# Patient Record
Sex: FEMALE | Race: WHITE | NOT HISPANIC OR LATINO | Employment: FULL TIME | URBAN - METROPOLITAN AREA
[De-identification: names, ages, dates, MRNs, and addresses within clinical notes are randomized per-mention and may not be internally consistent; named-entity substitution may affect disease eponyms.]

---

## 2023-09-28 LAB
ALANINE AMINOTRANSFERASE (SGPT) (U/L) IN SER/PLAS: 10 U/L (ref 7–45)
ALBUMIN (G/DL) IN SER/PLAS: 4.6 G/DL (ref 3.4–5)
ALKALINE PHOSPHATASE (U/L) IN SER/PLAS: 59 U/L (ref 33–110)
ANION GAP IN SER/PLAS: 15 MMOL/L (ref 10–20)
ASPARTATE AMINOTRANSFERASE (SGOT) (U/L) IN SER/PLAS: 14 U/L (ref 9–39)
BILIRUBIN TOTAL (MG/DL) IN SER/PLAS: 0.7 MG/DL (ref 0–1.2)
CALCIUM (MG/DL) IN SER/PLAS: 10 MG/DL (ref 8.6–10.6)
CARBON DIOXIDE, TOTAL (MMOL/L) IN SER/PLAS: 27 MMOL/L (ref 21–32)
CHLORIDE (MMOL/L) IN SER/PLAS: 102 MMOL/L (ref 98–107)
CHOLESTEROL (MG/DL) IN SER/PLAS: 193 MG/DL (ref 0–199)
CHOLESTEROL IN HDL (MG/DL) IN SER/PLAS: 74.7 MG/DL
CHOLESTEROL/HDL RATIO: 2.6
CREATININE (MG/DL) IN SER/PLAS: 0.87 MG/DL (ref 0.5–1.05)
GFR FEMALE: 80 ML/MIN/1.73M2
GLUCOSE (MG/DL) IN SER/PLAS: 78 MG/DL (ref 74–99)
LDL: 97 MG/DL (ref 0–99)
POTASSIUM (MMOL/L) IN SER/PLAS: 5.1 MMOL/L (ref 3.5–5.3)
PROTEIN TOTAL: 7.2 G/DL (ref 6.4–8.2)
SODIUM (MMOL/L) IN SER/PLAS: 139 MMOL/L (ref 136–145)
TRIGLYCERIDE (MG/DL) IN SER/PLAS: 106 MG/DL (ref 0–149)
UREA NITROGEN (MG/DL) IN SER/PLAS: 17 MG/DL (ref 6–23)
VLDL: 21 MG/DL (ref 0–40)

## 2023-11-14 ENCOUNTER — APPOINTMENT (OUTPATIENT)
Dept: PRIMARY CARE | Facility: CLINIC | Age: 53
End: 2023-11-14
Payer: COMMERCIAL

## 2023-12-16 DIAGNOSIS — N95.1 MENOPAUSAL AND FEMALE CLIMACTERIC STATES: ICD-10-CM

## 2023-12-18 RX ORDER — VENLAFAXINE HYDROCHLORIDE 37.5 MG/1
CAPSULE, EXTENDED RELEASE ORAL
Qty: 60 CAPSULE | Refills: 1 | Status: SHIPPED | OUTPATIENT
Start: 2023-12-18

## 2024-02-23 DIAGNOSIS — G47.00 INSOMNIA, UNSPECIFIED: ICD-10-CM

## 2024-02-23 RX ORDER — TRAZODONE HYDROCHLORIDE 50 MG/1
TABLET ORAL
Qty: 90 TABLET | Refills: 1 | Status: SHIPPED | OUTPATIENT
Start: 2024-02-23

## 2024-06-19 ENCOUNTER — APPOINTMENT (OUTPATIENT)
Dept: PRIMARY CARE | Facility: CLINIC | Age: 54
End: 2024-06-19
Payer: COMMERCIAL

## 2024-07-22 ENCOUNTER — OFFICE VISIT (OUTPATIENT)
Dept: PRIMARY CARE | Facility: CLINIC | Age: 54
End: 2024-07-22
Payer: COMMERCIAL

## 2024-07-22 ENCOUNTER — LAB (OUTPATIENT)
Dept: LAB | Facility: LAB | Age: 54
End: 2024-07-22
Payer: COMMERCIAL

## 2024-07-22 VITALS
SYSTOLIC BLOOD PRESSURE: 140 MMHG | WEIGHT: 178 LBS | RESPIRATION RATE: 16 BRPM | DIASTOLIC BLOOD PRESSURE: 90 MMHG | BODY MASS INDEX: 28.73 KG/M2 | TEMPERATURE: 97.3 F | HEART RATE: 88 BPM

## 2024-07-22 DIAGNOSIS — E04.9 THYROID ENLARGEMENT: Primary | ICD-10-CM

## 2024-07-22 DIAGNOSIS — Z00.00 PHYSICAL EXAM: ICD-10-CM

## 2024-07-22 DIAGNOSIS — E04.9 THYROID ENLARGEMENT: ICD-10-CM

## 2024-07-22 LAB
ALBUMIN SERPL BCP-MCNC: 4.7 G/DL (ref 3.4–5)
ALP SERPL-CCNC: 74 U/L (ref 33–110)
ALT SERPL W P-5'-P-CCNC: 12 U/L (ref 7–45)
ANION GAP SERPL CALC-SCNC: 10 MMOL/L (ref 10–20)
APPEARANCE UR: CLEAR
AST SERPL W P-5'-P-CCNC: 13 U/L (ref 9–39)
BASOPHILS # BLD AUTO: 0.07 X10*3/UL (ref 0–0.1)
BASOPHILS NFR BLD AUTO: 0.9 %
BILIRUB SERPL-MCNC: 0.3 MG/DL (ref 0–1.2)
BILIRUB UR STRIP.AUTO-MCNC: NEGATIVE MG/DL
BUN SERPL-MCNC: 19 MG/DL (ref 6–23)
CALCIUM SERPL-MCNC: 9.6 MG/DL (ref 8.6–10.3)
CAOX CRY #/AREA UR COMP ASSIST: ABNORMAL /HPF
CHLORIDE SERPL-SCNC: 108 MMOL/L (ref 98–107)
CHOLEST SERPL-MCNC: 242 MG/DL (ref 0–199)
CHOLESTEROL/HDL RATIO: 4
CO2 SERPL-SCNC: 26 MMOL/L (ref 21–32)
COLOR UR: YELLOW
CREAT SERPL-MCNC: 0.93 MG/DL (ref 0.5–1.05)
EGFRCR SERPLBLD CKD-EPI 2021: 74 ML/MIN/1.73M*2
EOSINOPHIL # BLD AUTO: 0.19 X10*3/UL (ref 0–0.7)
EOSINOPHIL NFR BLD AUTO: 2.5 %
ERYTHROCYTE [DISTWIDTH] IN BLOOD BY AUTOMATED COUNT: 13.4 % (ref 11.5–14.5)
GLUCOSE SERPL-MCNC: 105 MG/DL (ref 74–99)
GLUCOSE UR STRIP.AUTO-MCNC: NORMAL MG/DL
HCT VFR BLD AUTO: 45.1 % (ref 36–46)
HDLC SERPL-MCNC: 60.9 MG/DL
HGB BLD-MCNC: 14.8 G/DL (ref 12–16)
IMM GRANULOCYTES # BLD AUTO: 0.02 X10*3/UL (ref 0–0.7)
IMM GRANULOCYTES NFR BLD AUTO: 0.3 % (ref 0–0.9)
KETONES UR STRIP.AUTO-MCNC: ABNORMAL MG/DL
LDLC SERPL CALC-MCNC: 143 MG/DL
LEUKOCYTE ESTERASE UR QL STRIP.AUTO: ABNORMAL
LYMPHOCYTES # BLD AUTO: 2.18 X10*3/UL (ref 1.2–4.8)
LYMPHOCYTES NFR BLD AUTO: 29.1 %
MCH RBC QN AUTO: 30.5 PG (ref 26–34)
MCHC RBC AUTO-ENTMCNC: 32.8 G/DL (ref 32–36)
MCV RBC AUTO: 93 FL (ref 80–100)
MONOCYTES # BLD AUTO: 0.48 X10*3/UL (ref 0.1–1)
MONOCYTES NFR BLD AUTO: 6.4 %
MUCOUS THREADS #/AREA URNS AUTO: ABNORMAL /LPF
NEUTROPHILS # BLD AUTO: 4.55 X10*3/UL (ref 1.2–7.7)
NEUTROPHILS NFR BLD AUTO: 60.8 %
NITRITE UR QL STRIP.AUTO: NEGATIVE
NON HDL CHOLESTEROL: 181 MG/DL (ref 0–149)
NRBC BLD-RTO: 0 /100 WBCS (ref 0–0)
PH UR STRIP.AUTO: 6 [PH]
PLATELET # BLD AUTO: 286 X10*3/UL (ref 150–450)
POTASSIUM SERPL-SCNC: 4.7 MMOL/L (ref 3.5–5.3)
PROT SERPL-MCNC: 6.8 G/DL (ref 6.4–8.2)
PROT UR STRIP.AUTO-MCNC: ABNORMAL MG/DL
RBC # BLD AUTO: 4.85 X10*6/UL (ref 4–5.2)
RBC # UR STRIP.AUTO: NEGATIVE /UL
RBC #/AREA URNS AUTO: ABNORMAL /HPF
SODIUM SERPL-SCNC: 139 MMOL/L (ref 136–145)
SP GR UR STRIP.AUTO: 1.03
SQUAMOUS #/AREA URNS AUTO: ABNORMAL /HPF
T4 FREE SERPL-MCNC: 0.86 NG/DL (ref 0.61–1.12)
TRIGL SERPL-MCNC: 191 MG/DL (ref 0–149)
TSH SERPL-ACNC: 1.06 MIU/L (ref 0.44–3.98)
UROBILINOGEN UR STRIP.AUTO-MCNC: NORMAL MG/DL
VLDL: 38 MG/DL (ref 0–40)
WBC # BLD AUTO: 7.5 X10*3/UL (ref 4.4–11.3)
WBC #/AREA URNS AUTO: ABNORMAL /HPF

## 2024-07-22 PROCEDURE — 83036 HEMOGLOBIN GLYCOSYLATED A1C: CPT

## 2024-07-22 PROCEDURE — 84481 FREE ASSAY (FT-3): CPT

## 2024-07-22 PROCEDURE — 99213 OFFICE O/P EST LOW 20 MIN: CPT | Performed by: FAMILY MEDICINE

## 2024-07-22 PROCEDURE — 80061 LIPID PANEL: CPT

## 2024-07-22 PROCEDURE — 87086 URINE CULTURE/COLONY COUNT: CPT

## 2024-07-22 PROCEDURE — 84439 ASSAY OF FREE THYROXINE: CPT

## 2024-07-22 PROCEDURE — 80053 COMPREHEN METABOLIC PANEL: CPT

## 2024-07-22 PROCEDURE — 36415 COLL VENOUS BLD VENIPUNCTURE: CPT

## 2024-07-22 PROCEDURE — 81001 URINALYSIS AUTO W/SCOPE: CPT

## 2024-07-22 PROCEDURE — 84443 ASSAY THYROID STIM HORMONE: CPT

## 2024-07-22 PROCEDURE — 85025 COMPLETE CBC W/AUTO DIFF WBC: CPT

## 2024-07-22 RX ORDER — OMEPRAZOLE 40 MG/1
CAPSULE, DELAYED RELEASE ORAL
COMMUNITY

## 2024-07-22 RX ORDER — ALBUTEROL SULFATE 90 UG/1
AEROSOL, METERED RESPIRATORY (INHALATION)
COMMUNITY

## 2024-07-22 RX ORDER — MONTELUKAST SODIUM 10 MG/1
TABLET ORAL
COMMUNITY
Start: 2023-11-21

## 2024-07-22 RX ORDER — ATORVASTATIN CALCIUM 10 MG/1
TABLET, FILM COATED ORAL
COMMUNITY
Start: 2023-11-21

## 2024-07-22 RX ORDER — HYDROCHLOROTHIAZIDE 12.5 MG/1
CAPSULE ORAL
COMMUNITY
Start: 2023-11-21

## 2024-07-22 NOTE — PROGRESS NOTES
Subjective   Patient ID: Katie Napier is a 53 y.o. female who presents for Neck Pain (Pain in front of neck , swelling /Hurts to turn/Dry cough ).  HPI  Patinet has chornic cough   She might have hiatal hernia   She has GERD , scheduled for endoscopy   Cough , smokes 2 cigarettes int he morning  Neck is stiff   Lately feels like heaviness in her neck   Tenderness , gaining weight.   Only she presses on it it is very tender.         Review of Systems    Past Medical History:   Diagnosis Date    Chronic insomnia     Depression     Hyperlipidemia        Past Surgical History:   Procedure Laterality Date    CHOLECYSTECTOMY  04/06/2017    Cholecystectomy    HYSTERECTOMY  04/06/2017    Hysterectomy      Social History     Socioeconomic History    Marital status: Single   Tobacco Use    Smoking status: Every Day     Types: Cigarettes      No family history on file.    MEDICATIONS AND ALLERGIES:    ALLERGIES Patient has no known allergies.    MEDICATIONS   Current Outpatient Medications on File Prior to Visit   Medication Sig Dispense Refill    albuterol 90 mcg/actuation inhaler inhale 2 puffs by mouth every 4 hours as needed for relief of wheeze or cough      atorvastatin (Lipitor) 10 mg tablet 1 by mouth every day for chol      hydroCHLOROthiazide (Microzide) 12.5 mg capsule 1 by mouth every day      montelukast (Singulair) 10 mg tablet 1 by mouth every night at bedtime for cough      omeprazole (PriLOSEC) 40 mg DR capsule 1 by mouth every day      traZODone (Desyrel) 50 mg tablet 1 by mouth every night at bedtime as needed 90 tablet 1    venlafaxine XR (Effexor-XR) 37.5 mg 24 hr capsule ONE by MOUTH every DAY TIME 14 DAYS THEN TWO BY MOUTH EVERY DAY 60 capsule 1     No current facility-administered medications on file prior to visit.              Objective   Visit Vitals  /90 (BP Location: Left arm, Patient Position: Sitting, BP Cuff Size: Large adult)   Pulse 88   Temp 36.3 °C (97.3 °F) (Temporal)   Resp 16  "  Wt 80.7 kg (178 lb)   BMI 28.73 kg/m²   Smoking Status Every Day   BSA 1.94 m²          7/20/2021     4:03 PM 7/22/2024     3:47 PM   Vitals   Systolic 116 140   Diastolic 68 90   Heart Rate 83 88   Temp 37.2 °C (98.9 °F) 36.3 °C (97.3 °F)   Resp  16   Height (in) 1.676 m (5' 6\")    Weight (lb) 162 178   BMI 26.15 kg/m2 28.73 kg/m2   BSA (m2) 1.85 m2 1.94 m2   Visit Report  Report     Physical Exam  No lumph nodes felt   Thyroid fullness notes.       Assessment & Plan  Thyroid enlargement  Will check labs below   Will order thyroid US   If workup negative and endoscopy reassuring   Recommend ENT referral and laryngoscopy   And PFT   Advised to quit smoking.   Orders:    TSH; Future    T3, free; Future    T4, free; Future    CBC and Auto Differential; Future    Urine Culture; Future    Comprehensive Metabolic Panel; Future    Hemoglobin A1C; Future    Lipid Panel; Future    Urinalysis with Reflex Microscopic; Future    US thyroid; Future    Physical exam    Orders:    TSH; Future    T3, free; Future    T4, free; Future    CBC and Auto Differential; Future    Urine Culture; Future    Comprehensive Metabolic Panel; Future    Hemoglobin A1C; Future    Lipid Panel; Future    Urinalysis with Reflex Microscopic; Future    US thyroid; Future             "

## 2024-07-23 DIAGNOSIS — E04.9 THYROID ENLARGEMENT: Primary | ICD-10-CM

## 2024-07-23 LAB
BACTERIA UR CULT: NORMAL
EST. AVERAGE GLUCOSE BLD GHB EST-MCNC: 120 MG/DL
HBA1C MFR BLD: 5.8 %
T3FREE SERPL-MCNC: 3.2 PG/ML (ref 2.3–4.2)

## 2024-07-24 DIAGNOSIS — R82.998 CALCIUM OXALATE CRYSTALS IN URINE: Primary | ICD-10-CM

## 2024-07-24 DIAGNOSIS — E04.9 THYROID ENLARGEMENT: ICD-10-CM

## 2024-07-24 DIAGNOSIS — R31.9 HEMATURIA, UNSPECIFIED TYPE: Primary | ICD-10-CM

## 2024-07-26 ENCOUNTER — HOSPITAL ENCOUNTER (OUTPATIENT)
Dept: RADIOLOGY | Facility: CLINIC | Age: 54
Discharge: HOME | End: 2024-07-26
Payer: COMMERCIAL

## 2024-07-26 DIAGNOSIS — Z00.00 PHYSICAL EXAM: ICD-10-CM

## 2024-07-26 DIAGNOSIS — E04.9 THYROID ENLARGEMENT: ICD-10-CM

## 2024-07-26 PROCEDURE — 76536 US EXAM OF HEAD AND NECK: CPT

## 2024-07-29 DIAGNOSIS — E04.1 HOT THYROID NODULE: ICD-10-CM

## 2024-07-29 DIAGNOSIS — E04.1 RIGHT THYROID NODULE: Primary | ICD-10-CM

## 2024-07-29 DIAGNOSIS — E04.1 LEFT THYROID NODULE: ICD-10-CM

## 2024-07-30 ENCOUNTER — APPOINTMENT (OUTPATIENT)
Dept: SURGERY | Facility: CLINIC | Age: 54
End: 2024-07-30
Payer: COMMERCIAL

## 2024-07-30 DIAGNOSIS — K29.60 REFLUX GASTRITIS: ICD-10-CM

## 2024-07-30 DIAGNOSIS — R13.11 ORAL PHASE DYSPHAGIA: ICD-10-CM

## 2024-07-30 DIAGNOSIS — R68.81 EARLY SATIETY: Primary | ICD-10-CM

## 2024-07-30 PROCEDURE — 99203 OFFICE O/P NEW LOW 30 MIN: CPT | Performed by: PHYSICIAN ASSISTANT

## 2024-07-30 NOTE — PROGRESS NOTES
Subjective   Patient ID: Katie Napier is a 53 y.o. female who presents for New Patient Visit (ACID REFLUX).    HPI  This is a 53-year-old female who comes in with a complaint of difficulty swallowing feels like stuff is getting stuck, early satiety feeling of fullness and burning in her chest.  She is never had an EGD.  She is taking over-the-counter Pepcid and daily omeprazole.  Since this all started she was found to have a small nodule on her thyroid.  She is seeing Dr. Manolo Corley for and she is getting a fine-needle biopsy in a couple of weeks.    Review of Systems  Review of systems is negative other than what is mentioned above        Physical Exam  Eyes: Conjunctiva non -icteric and eye lids are without obvious rash or drooping. Pupils are symmetric.   Ears, Nose, Mouth, and Throat: External ears and nose appear to be without deformity or rash. No lesions or masses noted. Hearing is grossly intact.   Neck:. No JVD noted, tracheal position is midline. No thyromegaly, no thyroid nodules  Head and Face: Examination of the head and face revealed no abnormalities.   Respiratory: No gasping or shortness of breath noted, no use of accessory muscles noted.  Lungs are clear to auscultate  Cardiovascular: Examination for edema is normal, regular rate and rhythm S1-S2  GI: Abdomen non tender to palpation, bowel sounds are present  Skin: No rashes or open lesions/ulcers identified on skin.   Musk: Digits/nails show no clubbing or cyanosis. No asymmetry or masses noted of the musculature. Examination of the muscles/joints/bones show normal range of motion. Gait is grossly normally.   Neurologic: Cranial nerves II- XII intact, motor strength 5/5 muscle strength of the lower extremities bilaterally and equal.      Objective     No diagnosis found.   There is no problem list on file for this patient.     No Known Allergies   Medication Documentation Review Audit       Reviewed by Dariana Schuler MA (Medical  Assistant) on 07/30/24 at 1009      Medication Order Taking? Sig Documenting Provider Last Dose Status   albuterol 90 mcg/actuation inhaler [id number]  inhale 2 puffs by mouth every 4 hours as needed for relief of wheeze or cough Historical Provider, MD  Active   atorvastatin (Lipitor) 10 mg tablet [id number]  1 by mouth every day for chol Historical Provider, MD  Active   hydroCHLOROthiazide (Microzide) 12.5 mg capsule [id number]  1 by mouth every day Historical Provider, MD  Active   montelukast (Singulair) 10 mg tablet [id number]  1 by mouth every night at bedtime for cough Historical Provider, MD  Active   omeprazole (PriLOSEC) 40 mg DR capsule [id number]  1 by mouth every day Historical Provider, MD  Active   traZODone (Desyrel) 50 mg tablet [id number]  1 by mouth every night at bedtime as needed Gorge Oscar MD  Active   venlafaxine XR (Effexor-XR) 37.5 mg 24 hr capsule [id number]  ONE by MOUTH every DAY TIME 14 DAYS THEN TWO BY MOUTH EVERY DAY Gorge Oscar MD  Active                    Past Medical History:   Diagnosis Date    Chronic insomnia     Depression     Hyperlipidemia      Social History     Tobacco Use   Smoking Status Every Day    Types: Cigarettes   Smokeless Tobacco Not on file     No family history on file.   Past Surgical History:   Procedure Laterality Date    CHOLECYSTECTOMY  04/06/2017    Cholecystectomy    HYSTERECTOMY  04/06/2017    Hysterectomy       Assessment/Plan   Today we had a discussion about EGD.  Patient was informed this is done as an outpatient surgery.  It takes around 30 minutes. It is done under monitored anesthesia care.  Alternatives to procedure were discussed.  All questions answered risk and benefits were discussed.  Patient had complete understanding.  Patient would like to proceed.    Encounter Diagnoses   Name Primary?    Early satiety Yes    Reflux gastritis     Oral phase dysphagia      I have reviewed all data including labs,radiologic and previous  reports.      **Portions of this medical record have been created using voice recognition software and may have minor errors which are inherent in voice recognition systems. It has not been fully edited for typographical or grammatical errors**

## 2024-08-13 ENCOUNTER — LAB (OUTPATIENT)
Dept: LAB | Facility: LAB | Age: 54
End: 2024-08-13
Payer: COMMERCIAL

## 2024-08-13 DIAGNOSIS — R31.9 HEMATURIA, UNSPECIFIED TYPE: ICD-10-CM

## 2024-08-13 DIAGNOSIS — E04.9 THYROID ENLARGEMENT: ICD-10-CM

## 2024-08-13 LAB
APPEARANCE UR: CLEAR
BILIRUB UR STRIP.AUTO-MCNC: NEGATIVE MG/DL
COLOR UR: NORMAL
GLUCOSE UR STRIP.AUTO-MCNC: NORMAL MG/DL
KETONES UR STRIP.AUTO-MCNC: NEGATIVE MG/DL
LEUKOCYTE ESTERASE UR QL STRIP.AUTO: NEGATIVE
NITRITE UR QL STRIP.AUTO: NEGATIVE
PH UR STRIP.AUTO: 6 [PH]
PROT UR STRIP.AUTO-MCNC: NEGATIVE MG/DL
RBC # UR STRIP.AUTO: NEGATIVE /UL
SP GR UR STRIP.AUTO: 1.01
UROBILINOGEN UR STRIP.AUTO-MCNC: NORMAL MG/DL

## 2024-08-13 PROCEDURE — 36415 COLL VENOUS BLD VENIPUNCTURE: CPT

## 2024-08-13 PROCEDURE — 83970 ASSAY OF PARATHORMONE: CPT

## 2024-08-13 PROCEDURE — 81003 URINALYSIS AUTO W/O SCOPE: CPT

## 2024-08-14 LAB — PTH-INTACT SERPL-MCNC: 59.5 PG/ML (ref 18.5–88)

## 2024-08-15 ENCOUNTER — PATIENT MESSAGE (OUTPATIENT)
Dept: PRIMARY CARE | Facility: CLINIC | Age: 54
End: 2024-08-15
Payer: COMMERCIAL

## 2024-08-15 DIAGNOSIS — F41.9 ANXIETY: Primary | ICD-10-CM

## 2024-08-15 RX ORDER — FLUOXETINE 10 MG/1
10 CAPSULE ORAL DAILY
Qty: 30 CAPSULE | Refills: 3 | Status: SHIPPED | OUTPATIENT
Start: 2024-08-15 | End: 2024-12-13

## 2024-08-19 ENCOUNTER — APPOINTMENT (OUTPATIENT)
Dept: SURGERY | Facility: CLINIC | Age: 54
End: 2024-08-19
Payer: COMMERCIAL

## 2024-08-19 VITALS
SYSTOLIC BLOOD PRESSURE: 151 MMHG | DIASTOLIC BLOOD PRESSURE: 96 MMHG | BODY MASS INDEX: 28.57 KG/M2 | WEIGHT: 177 LBS | HEART RATE: 60 BPM

## 2024-08-19 DIAGNOSIS — E04.1 HOT THYROID NODULE: ICD-10-CM

## 2024-08-19 DIAGNOSIS — E04.1 LEFT THYROID NODULE: ICD-10-CM

## 2024-08-19 DIAGNOSIS — E04.2 MULTINODULAR THYROID: Primary | ICD-10-CM

## 2024-08-19 DIAGNOSIS — E04.1 RIGHT THYROID NODULE: ICD-10-CM

## 2024-08-19 PROCEDURE — 99204 OFFICE O/P NEW MOD 45 MIN: CPT | Performed by: SURGERY

## 2024-08-19 ASSESSMENT — ENCOUNTER SYMPTOMS
VOICE CHANGE: 1
SHORTNESS OF BREATH: 1
ENDOCRINE NEGATIVE: 1
CARDIOVASCULAR NEGATIVE: 1
NEUROLOGICAL NEGATIVE: 1
EYES NEGATIVE: 1
CONSTITUTIONAL NEGATIVE: 1
TROUBLE SWALLOWING: 1
MUSCULOSKELETAL NEGATIVE: 1
PSYCHIATRIC NEGATIVE: 1

## 2024-08-19 NOTE — LETTER
August 19, 2024     Laura L Seaver, APRN-CNP  [address]  [facility]  [place] [address]    Patient: Katie Napier   YOB: 1970   Date of Visit: 8/19/2024       Dear Dr. Laura L Seaver, KATE-CNP:    Thank you for referring Katie Napier to me for evaluation. Below are my notes for this consultation.  If you have questions, please do not hesitate to call me. I look forward to following your patient along with you.       Sincerely,     Manolo Corley MD      CC: No Recipients  ______________________________________________________________________________________    Subjective   Patient ID: Katie Napier is a 53 y.o. female who presents for surgical consultation for multinodular thyroid gland.    HPI I saw Mrs. Napier in surgery clinic today.  She was referred by Laura Seaver for evaluation of a multinodular thyroid gland.  In July 2024 she underwent a thyroid ultrasound for an enlarged tender thyroid gland.  This demonstrated a 1.9 cm TI-RADS 4 nodule in the right thyroid lobe and a 9 mm cyst in the left lobe.  She was referred for further evaluation of the nodule in the right thyroid lobe.    I personally reviewed the ultrasound imaging and I would agree with radiology that this area in the right thyroid lobe is very indistinct.  Based on their measurements I think they may have actually over measure the actual nodule itself.  It is not a very well-organized nodule.  This is something I think we should plan a 6-month follow-up on to see if it develops and do a true nodule that would warrant formal biopsy.    I did review the actual images with her and you as you accompanied her to the office today.    She is clinically and biochemically euthyroid with a TSH of 1.06.  Free T3 and free T4 normal at 3.2 and 0.86 respectively.    She has been having a lot of symptoms of gastroesophageal reflux disease.  Mild difficulty with swallowing.  She is undergoing an EGD for this next week.   Mild respiratory symptoms when she turns her head.  No history of head neck or chest radiation exposure.    Social history she is a pharmacist at [employer].    Family history-I care for her sister who has nodular thyroid disease but does not require any thyroid surgery.    Review of Systems   Constitutional: Negative.    HENT:  Positive for trouble swallowing and voice change.    Eyes: Negative.    Respiratory:  Positive for shortness of breath.    Cardiovascular: Negative.    Endocrine: Negative.    Musculoskeletal: Negative.    Neurological: Negative.    Psychiatric/Behavioral: Negative.         Objective   Physical Exam  Vitals reviewed.   Constitutional:       Appearance: Normal appearance.   Eyes:      Comments: No proptosis   Neck:      Vascular: No carotid bruit.      Comments: Her thyroid feels normal.  No palpable nodules.  Trachea midline.  Cardiovascular:      Rate and Rhythm: Normal rate and regular rhythm.      Heart sounds: Normal heart sounds.   Pulmonary:      Effort: No respiratory distress.      Breath sounds: Normal breath sounds. No wheezing or rales.   Musculoskeletal:         General: Normal range of motion.   Lymphadenopathy:      Cervical: No cervical adenopathy.   Skin:     General: Skin is warm.   Neurological:      General: No focal deficit present.      Mental Status: She is alert and oriented to person, place, and time.   Psychiatric:         Mood and Affect: Mood normal.         Behavior: Behavior normal.         US THYROID;  7/26/2024 4:57 pm      INDICATION:  Signs/Symptoms:enlarged , tender thyroid.      COMPARISON:  None.      ACCESSION NUMBER(S):  [id number]      ORDERING CLINICIAN:  ALEIDA HERRERA      TECHNIQUE:  Multiple ultrasonographic images of the thyroid gland were obtained.      FINDINGS:          RIGHT LOBE:  The right lobe of the thyroid measures 2.1 x 1.6 x 5.5. The right  lobe of the thyroid is homogeneous in echotexture and demonstrates a  hypoechoic  ill-defined solid nodule measuring 19 x 10 x 17 mm. This  is mostly solid with a small cystic component and TR 4.      LEFT LOBE:  The left lobe of the thyroid measures 1.9 x 1.3 x 5.4. The left lobe  of the thyroid is homogeneous in echotexture and demonstrates a  spongiform nodule measuring 9 x 5 x 7 mm and TR 1.      ISTHMUS:  The isthmus measures approximately .3 and is homogeneous in  echotexture and without any identifiable nodules.      CERVICAL LYMPH NODES:  No significant adenopathy.      IMPRESSION:  Homogeneous thyroid gland with an ill-defined hypoechoic solid TR 4  right lobe nodule.    Assessment/Plan    Mrs. Napier underwent a recent thyroid ultrasound showing a very indistinct ill-defined nodule as mentioned in the radiology report and her right thyroid lobe measured at 1.9 cm in maximum dimension considered TI-RADS 4.  I personally reviewed the images and would agree it is very indistinct.  Tough to say if this is a well-defined nodule or not.  In addition, the borders of what are outlined on the ultrasound for measurements appear to extend beyond what I would sort of consider the potential nodule.  Therefore I think it is actually quite a bit smaller than this.  As the area is very indistinct, I would not recommend biopsy at this time.  However, I do think she would warrant a short-term follow-up ultrasound.  This would be to determine if the nodule develops into a bit more coalesced area that would warrant biopsy.    Plan    1.  I will order a 6-month follow-up ultrasound for her.  We can call her with results.  If the area looks like it develops into a well-organized nodule that meets criteria I can bring her back for biopsy otherwise we can continue with ultrasonographic surveillance.         Manolo Corley MD 08/19/24 9:32 AM

## 2024-08-19 NOTE — PROGRESS NOTES
Subjective   Patient ID: Katie Napier is a 53 y.o. female who presents for surgical consultation for multinodular thyroid gland.    HPI I saw Mrs. Napier in surgery clinic today.  She was referred by Laura Seaver for evaluation of a multinodular thyroid gland.  In July 2024 she underwent a thyroid ultrasound for an enlarged tender thyroid gland.  This demonstrated a 1.9 cm TI-RADS 4 nodule in the right thyroid lobe and a 9 mm cyst in the left lobe.  She was referred for further evaluation of the nodule in the right thyroid lobe.    I personally reviewed the ultrasound imaging and I would agree with radiology that this area in the right thyroid lobe is very indistinct.  Based on their measurements I think they may have actually over measure the actual nodule itself.  It is not a very well-organized nodule.  This is something I think we should plan a 6-month follow-up on to see if it develops and do a true nodule that would warrant formal biopsy.    I did review the actual images with her and you as you accompanied her to the office today.    She is clinically and biochemically euthyroid with a TSH of 1.06.  Free T3 and free T4 normal at 3.2 and 0.86 respectively.    She has been having a lot of symptoms of gastroesophageal reflux disease.  Mild difficulty with swallowing.  She is undergoing an EGD for this next week.  Mild respiratory symptoms when she turns her head.  No history of head neck or chest radiation exposure.    Social history she is a pharmacist at [employer].    Family history-I care for her sister who has nodular thyroid disease but does not require any thyroid surgery.    Review of Systems   Constitutional: Negative.    HENT:  Positive for trouble swallowing and voice change.    Eyes: Negative.    Respiratory:  Positive for shortness of breath.    Cardiovascular: Negative.    Endocrine: Negative.    Musculoskeletal: Negative.    Neurological: Negative.     Psychiatric/Behavioral: Negative.         Objective   Physical Exam  Vitals reviewed.   Constitutional:       Appearance: Normal appearance.   Eyes:      Comments: No proptosis   Neck:      Vascular: No carotid bruit.      Comments: Her thyroid feels normal.  No palpable nodules.  Trachea midline.  Cardiovascular:      Rate and Rhythm: Normal rate and regular rhythm.      Heart sounds: Normal heart sounds.   Pulmonary:      Effort: No respiratory distress.      Breath sounds: Normal breath sounds. No wheezing or rales.   Musculoskeletal:         General: Normal range of motion.   Lymphadenopathy:      Cervical: No cervical adenopathy.   Skin:     General: Skin is warm.   Neurological:      General: No focal deficit present.      Mental Status: She is alert and oriented to person, place, and time.   Psychiatric:         Mood and Affect: Mood normal.         Behavior: Behavior normal.         US THYROID;  7/26/2024 4:57 pm      INDICATION:  Signs/Symptoms:enlarged , tender thyroid.      COMPARISON:  None.      ACCESSION NUMBER(S):  [id number]      ORDERING CLINICIAN:  ALEIDA HERRERA      TECHNIQUE:  Multiple ultrasonographic images of the thyroid gland were obtained.      FINDINGS:          RIGHT LOBE:  The right lobe of the thyroid measures 2.1 x 1.6 x 5.5. The right  lobe of the thyroid is homogeneous in echotexture and demonstrates a  hypoechoic ill-defined solid nodule measuring 19 x 10 x 17 mm. This  is mostly solid with a small cystic component and TR 4.      LEFT LOBE:  The left lobe of the thyroid measures 1.9 x 1.3 x 5.4. The left lobe  of the thyroid is homogeneous in echotexture and demonstrates a  spongiform nodule measuring 9 x 5 x 7 mm and TR 1.      ISTHMUS:  The isthmus measures approximately .3 and is homogeneous in  echotexture and without any identifiable nodules.      CERVICAL LYMPH NODES:  No significant adenopathy.      IMPRESSION:  Homogeneous thyroid gland with an ill-defined hypoechoic solid  TR 4  right lobe nodule.    Assessment/Plan    Mrs. Napier underwent a recent thyroid ultrasound showing a very indistinct ill-defined nodule as mentioned in the radiology report and her right thyroid lobe measured at 1.9 cm in maximum dimension considered TI-RADS 4.  I personally reviewed the images and would agree it is very indistinct.  Tough to say if this is a well-defined nodule or not.  In addition, the borders of what are outlined on the ultrasound for measurements appear to extend beyond what I would sort of consider the potential nodule.  Therefore I think it is actually quite a bit smaller than this.  As the area is very indistinct, I would not recommend biopsy at this time.  However, I do think she would warrant a short-term follow-up ultrasound.  This would be to determine if the nodule develops into a bit more coalesced area that would warrant biopsy.    Plan    1.  I will order a 6-month follow-up ultrasound for her.  We can call her with results.  If the area looks like it develops into a well-organized nodule that meets criteria I can bring her back for biopsy otherwise we can continue with ultrasonographic surveillance.         Manolo Corley MD 08/19/24 9:32 AM

## 2024-11-01 ENCOUNTER — OFFICE VISIT (OUTPATIENT)
Dept: PRIMARY CARE | Facility: CLINIC | Age: 54
End: 2024-11-01
Payer: COMMERCIAL

## 2024-11-01 VITALS
TEMPERATURE: 97.6 F | OXYGEN SATURATION: 93 % | SYSTOLIC BLOOD PRESSURE: 134 MMHG | BODY MASS INDEX: 29.89 KG/M2 | WEIGHT: 186 LBS | HEIGHT: 66 IN | HEART RATE: 73 BPM | DIASTOLIC BLOOD PRESSURE: 91 MMHG

## 2024-11-01 DIAGNOSIS — I10 HYPERTENSION, UNSPECIFIED TYPE: Primary | ICD-10-CM

## 2024-11-01 DIAGNOSIS — G47.00 INSOMNIA, UNSPECIFIED: ICD-10-CM

## 2024-11-01 DIAGNOSIS — R73.03 PREDIABETES: ICD-10-CM

## 2024-11-01 DIAGNOSIS — E78.5 HYPERLIPIDEMIA, UNSPECIFIED HYPERLIPIDEMIA TYPE: ICD-10-CM

## 2024-11-01 DIAGNOSIS — F41.9 ANXIETY: ICD-10-CM

## 2024-11-01 DIAGNOSIS — J45.909 REACTIVE AIRWAY DISEASE WITHOUT COMPLICATION, UNSPECIFIED ASTHMA SEVERITY, UNSPECIFIED WHETHER PERSISTENT (HHS-HCC): ICD-10-CM

## 2024-11-01 DIAGNOSIS — K21.00 GASTROESOPHAGEAL REFLUX DISEASE WITH ESOPHAGITIS WITHOUT HEMORRHAGE: ICD-10-CM

## 2024-11-01 PROCEDURE — 3080F DIAST BP >= 90 MM HG: CPT | Performed by: FAMILY MEDICINE

## 2024-11-01 PROCEDURE — 3075F SYST BP GE 130 - 139MM HG: CPT | Performed by: FAMILY MEDICINE

## 2024-11-01 PROCEDURE — 99214 OFFICE O/P EST MOD 30 MIN: CPT | Performed by: FAMILY MEDICINE

## 2024-11-01 PROCEDURE — 3008F BODY MASS INDEX DOCD: CPT | Performed by: FAMILY MEDICINE

## 2024-11-01 RX ORDER — TRAZODONE HYDROCHLORIDE 50 MG/1
50 TABLET ORAL NIGHTLY
Qty: 90 TABLET | Refills: 3 | Status: SHIPPED | OUTPATIENT
Start: 2024-11-01 | End: 2025-11-01

## 2024-11-01 RX ORDER — METFORMIN HYDROCHLORIDE 500 MG/1
500 TABLET, EXTENDED RELEASE ORAL
Qty: 30 TABLET | Refills: 11 | Status: SHIPPED | OUTPATIENT
Start: 2024-11-01 | End: 2025-11-01

## 2024-11-01 RX ORDER — ATORVASTATIN CALCIUM 20 MG/1
20 TABLET, FILM COATED ORAL DAILY
Qty: 90 TABLET | Refills: 3 | Status: SHIPPED | OUTPATIENT
Start: 2024-11-01 | End: 2025-11-01

## 2024-11-01 RX ORDER — MONTELUKAST SODIUM 10 MG/1
10 TABLET ORAL NIGHTLY
Qty: 90 TABLET | Refills: 3 | Status: SHIPPED | OUTPATIENT
Start: 2024-11-01 | End: 2025-11-01

## 2024-11-01 RX ORDER — FLUOXETINE 10 MG/1
10 CAPSULE ORAL DAILY
Qty: 90 CAPSULE | Refills: 3 | Status: SHIPPED | OUTPATIENT
Start: 2024-11-01 | End: 2025-11-01

## 2024-11-01 RX ORDER — HYDROCHLOROTHIAZIDE 12.5 MG/1
12.5 CAPSULE ORAL EVERY MORNING
Qty: 90 CAPSULE | Refills: 3 | Status: SHIPPED | OUTPATIENT
Start: 2024-11-01 | End: 2025-11-01

## 2024-11-08 DIAGNOSIS — H00.012 HORDEOLUM EXTERNUM OF RIGHT LOWER EYELID: Primary | ICD-10-CM

## 2024-11-08 RX ORDER — OFLOXACIN 3 MG/ML
1 SOLUTION/ DROPS OPHTHALMIC 4 TIMES DAILY
Qty: 5 ML | Refills: 1 | Status: SHIPPED | OUTPATIENT
Start: 2024-11-08 | End: 2024-11-18

## 2024-12-02 ENCOUNTER — PATIENT MESSAGE (OUTPATIENT)
Dept: PRIMARY CARE | Facility: CLINIC | Age: 54
End: 2024-12-02
Payer: COMMERCIAL

## 2024-12-02 DIAGNOSIS — N30.00 ACUTE CYSTITIS WITHOUT HEMATURIA: Primary | ICD-10-CM

## 2024-12-02 LAB
NON-UH HIE APPEARANCE, U: CLEAR
NON-UH HIE BILIRUBIN, U: NEGATIVE
NON-UH HIE BLOOD, U: NEGATIVE
NON-UH HIE COLOR, U: NORMAL
NON-UH HIE GLUCOSE QUAL, U: NEGATIVE
NON-UH HIE KETONES, U: NEGATIVE
NON-UH HIE LEUKOCYTE ESTERASE, U: NEGATIVE
NON-UH HIE NITRITE, U: NEGATIVE
NON-UH HIE PH, U: 6.5 (ref 4.5–8)
NON-UH HIE PROTEIN, U: NEGATIVE
NON-UH HIE SPECIFIC GRAVITY, U: 1.02 (ref 1–1.03)
NON-UH HIE U MICRO: NORMAL
NON-UH HIE UROBILINOGEN QUAL, U: NORMAL

## 2024-12-02 RX ORDER — NITROFURANTOIN 25; 75 MG/1; MG/1
100 CAPSULE ORAL 2 TIMES DAILY
Qty: 10 CAPSULE | Refills: 0 | Status: SHIPPED | OUTPATIENT
Start: 2024-12-02 | End: 2024-12-07

## 2024-12-04 ENCOUNTER — TELEPHONE (OUTPATIENT)
Dept: PRIMARY CARE | Facility: CLINIC | Age: 54
End: 2024-12-04
Payer: COMMERCIAL

## 2024-12-04 DIAGNOSIS — N39.0 URINARY TRACT INFECTION WITHOUT HEMATURIA, SITE UNSPECIFIED: Primary | ICD-10-CM

## 2024-12-04 RX ORDER — SULFAMETHOXAZOLE AND TRIMETHOPRIM 800; 160 MG/1; MG/1
1 TABLET ORAL 2 TIMES DAILY
Qty: 10 TABLET | Refills: 0 | Status: SHIPPED | OUTPATIENT
Start: 2024-12-04 | End: 2024-12-09

## 2024-12-04 NOTE — TELEPHONE ENCOUNTER
----- Message from Gorge Rodriguez sent at 12/4/2024 10:16 AM EST -----  Culture showing Ecoli   Will treat with bacrim 1 tab twice daily for 5 days

## 2025-03-27 ENCOUNTER — TELEMEDICINE (OUTPATIENT)
Dept: PRIMARY CARE | Facility: CLINIC | Age: 55
End: 2025-03-27
Payer: COMMERCIAL

## 2025-03-27 ENCOUNTER — OFFICE VISIT (OUTPATIENT)
Dept: PRIMARY CARE | Facility: CLINIC | Age: 55
End: 2025-03-27
Payer: COMMERCIAL

## 2025-03-27 VITALS
SYSTOLIC BLOOD PRESSURE: 135 MMHG | BODY MASS INDEX: 30.22 KG/M2 | HEART RATE: 79 BPM | RESPIRATION RATE: 16 BRPM | DIASTOLIC BLOOD PRESSURE: 100 MMHG | WEIGHT: 188 LBS | HEIGHT: 66 IN

## 2025-03-27 VITALS
HEIGHT: 66 IN | HEART RATE: 97 BPM | SYSTOLIC BLOOD PRESSURE: 135 MMHG | RESPIRATION RATE: 16 BRPM | WEIGHT: 188 LBS | DIASTOLIC BLOOD PRESSURE: 100 MMHG | BODY MASS INDEX: 30.22 KG/M2

## 2025-03-27 DIAGNOSIS — I10 HYPERTENSION, UNSPECIFIED TYPE: ICD-10-CM

## 2025-03-27 DIAGNOSIS — R10.32 LEFT LOWER QUADRANT ABDOMINAL PAIN: Primary | ICD-10-CM

## 2025-03-27 PROCEDURE — 3080F DIAST BP >= 90 MM HG: CPT | Performed by: FAMILY MEDICINE

## 2025-03-27 PROCEDURE — 3008F BODY MASS INDEX DOCD: CPT | Performed by: FAMILY MEDICINE

## 2025-03-27 PROCEDURE — 3075F SYST BP GE 130 - 139MM HG: CPT | Performed by: FAMILY MEDICINE

## 2025-03-27 PROCEDURE — 99214 OFFICE O/P EST MOD 30 MIN: CPT | Performed by: FAMILY MEDICINE

## 2025-03-27 RX ORDER — AMOXICILLIN AND CLAVULANATE POTASSIUM 875; 125 MG/1; MG/1
875 TABLET, FILM COATED ORAL 2 TIMES DAILY
Qty: 20 TABLET | Refills: 0 | Status: SHIPPED | OUTPATIENT
Start: 2025-03-27 | End: 2025-04-06

## 2025-03-27 RX ORDER — DICYCLOMINE HYDROCHLORIDE 10 MG/1
10 CAPSULE ORAL 4 TIMES DAILY
COMMUNITY
Start: 2025-03-26 | End: 2025-06-24

## 2025-03-27 RX ORDER — HYDROCHLOROTHIAZIDE 25 MG/1
25 TABLET ORAL DAILY
Qty: 90 TABLET | Refills: 3 | Status: SHIPPED | OUTPATIENT
Start: 2025-03-27 | End: 2026-03-27

## 2025-03-27 NOTE — PROGRESS NOTES
Subjective   Patient ID: Katie Napier is a 54 y.o. female who presents for Abdominal Pain (LOWER LEFT SIDE FOR ABOUT A MONTH ).  HPI    HX OF HYSTERECOMTY 2012   TRANSVAGINAL us   WITH HYSTERECTOMY THEY KEPT   THEY REPORTED BOWEL SPASM   ON [facility]   SHE TOOK DICYUCLOMIN   SYMPTOMS IMPROVED   SHE HAS CONSTANT PAIN ON THE CONSTANT IRRITATION   ONLY WHEN SHE PRESSES IT GETS BACK   COLOSNCOPY 2022 SHOWED DIVERTICULI   FILLS OUT EASILY   SHE HAS A LOT OF BLOATING   EARLY SATIETY , HAS BEEN GAINING.       Review of Systems    Past Medical History:   Diagnosis Date    Chronic insomnia     Depression     Hyperlipidemia        Past Surgical History:   Procedure Laterality Date    CHOLECYSTECTOMY  04/06/2017    Cholecystectomy    HYSTERECTOMY  04/06/2017    Hysterectomy      Social History     Socioeconomic History    Marital status: Single   Tobacco Use    Smoking status: Every Day     Types: Cigarettes      No family history on file.    MEDICATIONS AND ALLERGIES:    ALLERGIES Patient has no known allergies.    MEDICATIONS   Current Outpatient Medications on File Prior to Visit   Medication Sig Dispense Refill    dicyclomine (Bentyl) 10 mg capsule Take 1 capsule (10 mg) by mouth 4 times a day.      albuterol 90 mcg/actuation inhaler inhale 2 puffs by mouth every 4 hours as needed for relief of wheeze or cough      atorvastatin (Lipitor) 20 mg tablet Take 1 tablet (20 mg) by mouth once daily. 90 tablet 3    FLUoxetine (PROzac) 10 mg capsule Take 1 capsule (10 mg) by mouth once daily. 90 capsule 3    metFORMIN XR (Glucophage-XR) 500 mg 24 hr tablet Take 1 tablet (500 mg) by mouth once daily with breakfast. Do not crush, chew, or split. 30 tablet 11    montelukast (Singulair) 10 mg tablet Take 1 tablet (10 mg) by mouth once daily at bedtime. 90 tablet 3    omeprazole (PriLOSEC) 40 mg DR capsule 1 by mouth every day      traZODone (Desyrel) 50 mg tablet Take 1 tablet (50 mg) by mouth once daily at bedtime. 90 tablet 3     "[DISCONTINUED] hydroCHLOROthiazide (Microzide) 12.5 mg capsule Take 1 capsule (12.5 mg) by mouth once daily in the morning. 90 capsule 3     No current facility-administered medications on file prior to visit.              Objective   Visit Vitals  BP (!) 135/100   Pulse 79   Resp 16   Ht 1.676 m (5' 6\")   Wt 85.3 kg (188 lb)   BMI 30.34 kg/m²   Smoking Status Every Day   BSA 1.99 m²          7/20/2021     4:03 PM 7/22/2024     3:47 PM 8/19/2024     9:32 AM 11/1/2024     3:40 PM 3/27/2025    11:41 AM 3/27/2025    11:47 AM   Vitals   Systolic 116 140 151 134 135 135   Diastolic 68 90 96 91 100 100   BP Location  Left arm       Heart Rate 83 88 60 73 97 79   Temp 37.2 °C (98.9 °F) 36.3 °C (97.3 °F)  36.4 °C (97.6 °F)     Resp  16   16 16   Height 1.676 m (5' 6\")   1.676 m (5' 6\") 1.676 m (5' 6\") 1.676 m (5' 6\")   Weight (lb) 162 178 177 186 188 188   BMI 26.15 kg/m2 28.73 kg/m2 28.57 kg/m2 30.02 kg/m2 30.34 kg/m2 30.34 kg/m2   BSA (m2) 1.85 m2 1.94 m2 1.93 m2 1.98 m2 1.99 m2 1.99 m2   Visit Report  Report Report Report Report Report     Physical Exam  Abdominal tenderness on RUQ , LLQ   Not currently in distress.       Assessment & Plan  Left lower quadrant abdominal pain  Will order labs below   Will order CT scan   Will start patient on amox/clav  I explained the risk of antibiotics that could include diarrhea, development or resistant strains , Cdiff infection , profuse diarrhea , other opportunistic infection explained , possible allergies and she was advised to review the medication leaflet before she starts the treatment , she verbalized understanding and consented on starting the treatment.   Alarming signs   Instructed pt to contact clinic is symptoms fail to improve or to return to clinic earlier if symptoms worsen   Counseled pt on warning signs of when to present to ER, they verbalized understanding.     Orders:    CBC and Auto Differential; Future    Comprehensive metabolic panel; Future    Sedimentation " Rate; Future    C-reactive protein; Future    CT abdomen pelvis wo IV contrast; Future    amoxicillin-pot clavulanate (Augmentin) 875-125 mg tablet; Take 1 tablet (875 mg) by mouth 2 times a day for 10 days.    Hypertension, unspecified type  Will check labs below   Incraese hydrochlorothiazide to 25mg   Will add the labs below   Orders:    CBC and Auto Differential; Future    Comprehensive metabolic panel; Future    Sedimentation Rate; Future    C-reactive protein; Future    CT abdomen pelvis wo IV contrast; Future    amoxicillin-pot clavulanate (Augmentin) 875-125 mg tablet; Take 1 tablet (875 mg) by mouth 2 times a day for 10 days.    hydroCHLOROthiazide (HYDRODiuril) 25 mg tablet; Take 1 tablet (25 mg) by mouth once daily.

## 2025-04-03 ENCOUNTER — HOSPITAL ENCOUNTER (OUTPATIENT)
Dept: RADIOLOGY | Facility: CLINIC | Age: 55
Discharge: HOME | End: 2025-04-03
Payer: COMMERCIAL

## 2025-04-03 DIAGNOSIS — I10 HYPERTENSION, UNSPECIFIED TYPE: ICD-10-CM

## 2025-04-03 DIAGNOSIS — R10.32 LEFT LOWER QUADRANT ABDOMINAL PAIN: ICD-10-CM

## 2025-04-03 PROCEDURE — 74176 CT ABD & PELVIS W/O CONTRAST: CPT

## 2025-06-25 DIAGNOSIS — Z12.31 ENCOUNTER FOR SCREENING MAMMOGRAM FOR BREAST CANCER: ICD-10-CM
